# Patient Record
Sex: FEMALE
[De-identification: names, ages, dates, MRNs, and addresses within clinical notes are randomized per-mention and may not be internally consistent; named-entity substitution may affect disease eponyms.]

---

## 2023-04-05 PROBLEM — Z00.00 ENCOUNTER FOR PREVENTIVE HEALTH EXAMINATION: Status: ACTIVE | Noted: 2023-04-05

## 2023-04-06 ENCOUNTER — APPOINTMENT (OUTPATIENT)
Dept: OTOLARYNGOLOGY | Facility: CLINIC | Age: 73
End: 2023-04-06
Payer: MEDICARE

## 2023-04-06 VITALS
DIASTOLIC BLOOD PRESSURE: 65 MMHG | HEIGHT: 60 IN | TEMPERATURE: 97.3 F | OXYGEN SATURATION: 97 % | SYSTOLIC BLOOD PRESSURE: 93 MMHG | WEIGHT: 148 LBS | BODY MASS INDEX: 29.06 KG/M2 | RESPIRATION RATE: 16 BRPM | HEART RATE: 82 BPM

## 2023-04-06 DIAGNOSIS — Z86.39 PERSONAL HISTORY OF OTHER ENDOCRINE, NUTRITIONAL AND METABOLIC DISEASE: ICD-10-CM

## 2023-04-06 DIAGNOSIS — Z80.9 FAMILY HISTORY OF MALIGNANT NEOPLASM, UNSPECIFIED: ICD-10-CM

## 2023-04-06 DIAGNOSIS — H60.8X3 OTHER OTITIS EXTERNA, BILATERAL: ICD-10-CM

## 2023-04-06 DIAGNOSIS — H91.90 UNSPECIFIED HEARING LOSS, UNSPECIFIED EAR: ICD-10-CM

## 2023-04-06 DIAGNOSIS — Z83.3 FAMILY HISTORY OF DIABETES MELLITUS: ICD-10-CM

## 2023-04-06 DIAGNOSIS — Z78.9 OTHER SPECIFIED HEALTH STATUS: ICD-10-CM

## 2023-04-06 DIAGNOSIS — Z86.79 PERSONAL HISTORY OF OTHER DISEASES OF THE CIRCULATORY SYSTEM: ICD-10-CM

## 2023-04-06 DIAGNOSIS — Z82.49 FAMILY HISTORY OF ISCHEMIC HEART DISEASE AND OTHER DISEASES OF THE CIRCULATORY SYSTEM: ICD-10-CM

## 2023-04-06 DIAGNOSIS — H61.23 IMPACTED CERUMEN, BILATERAL: ICD-10-CM

## 2023-04-06 DIAGNOSIS — C80.1 MALIGNANT (PRIMARY) NEOPLASM, UNSPECIFIED: ICD-10-CM

## 2023-04-06 PROCEDURE — 69210 REMOVE IMPACTED EAR WAX UNI: CPT

## 2023-04-06 PROCEDURE — 99203 OFFICE O/P NEW LOW 30 MIN: CPT | Mod: 25

## 2023-04-06 RX ORDER — MOMETASONE FUROATE 1 MG/G
0.1 CREAM TOPICAL
Qty: 1 | Refills: 1 | Status: ACTIVE | COMMUNITY
Start: 2023-04-06 | End: 1900-01-01

## 2023-04-06 NOTE — PROCEDURE
[Cerumen Impaction] : Cerumen Impaction [Same] : same as the Pre Op Dx. [] : Removal of Cerumen [FreeTextEntry5] : b chronic oe, b copious cerumen removed atraumatically with suction l>r, b TMs nl

## 2023-04-06 NOTE — ASSESSMENT
[FreeTextEntry1] : 1. chronic oe d/t eczema\par -elocon cream - instructed how to use \par 2. b cerumen impaction \par -cerumen removed\par -ears felt better and back to baseline -  offered but she felt she did not need it\par -advised to avoid qtip usage\par RTC in 1 yr or sooner as needed

## 2023-04-06 NOTE — HISTORY OF PRESENT ILLNESS
[de-identified] : 71 y/o F here with her  is presenting with b hearing loss. She contracted COVID in May 2022 and she recovered and became ill again and was told she had long COVID. She is presenting with hearing loss ever since, l>r and reports it is worse for the past 4-5 months. Prior to this she had no issues with her hearing. She is also c/o l aural fullness and ear itch. She feels there may be dandruff in her left ear. She denies otalgia or drainage. She denies tinnitus or dizziness. No FH or SH pertinent to cc. Denies loud noise exposure. Patient denies h/o childhood ear infections. Nonsmoker.

## 2023-04-06 NOTE — PHYSICAL EXAM
[Midline] : trachea located in midline position [de-identified] : b chronic oe, b copious cerumen removed atraumatically with suction l>r, b TMs nl  [Normal] : no nystagmus [de-identified] : gait steady